# Patient Record
Sex: FEMALE | Race: WHITE | NOT HISPANIC OR LATINO | Employment: OTHER | URBAN - METROPOLITAN AREA
[De-identification: names, ages, dates, MRNs, and addresses within clinical notes are randomized per-mention and may not be internally consistent; named-entity substitution may affect disease eponyms.]

---

## 2018-01-29 PROBLEM — F41.8 ANXIETY WITH DEPRESSION: Status: ACTIVE | Noted: 2018-01-29

## 2019-02-08 ENCOUNTER — TELEPHONE (OUTPATIENT)
Dept: PHARMACY | Facility: CLINIC | Age: 49
End: 2019-02-08

## 2019-02-13 NOTE — TELEPHONE ENCOUNTER
DOCUMENTATION ONLY:  Prior Authorization for Emgality approved from 02/12/19 to 02/12/2020    Case Id: [id number]    Co-pay: $3.00    Patient Assistance IS NOT required.    Forwarded to the clinical pharmacist for consult and shipment.    -ARR

## 2019-02-28 ENCOUNTER — TELEPHONE (OUTPATIENT)
Dept: PHARMACY | Facility: CLINIC | Age: 49
End: 2019-02-28

## 2019-02-28 NOTE — TELEPHONE ENCOUNTER
Initial Emgality consult completed on . Emgality 120mg maintenance dose will be shipped on 3/6 to arrive at patient's home on 3/7 via [employer]. $3.00 copay. Patient intends to continue Emgality on 3/7. Meds, allergies, and conditions reviewed. Address confirmed. Confirmed 2 patient identifiers - name and . Therapy Appropriate.    Ms. Dominguez was started on Emgality in the provider's office on 19 so she has already had her loading dose. She has been doing well with no side effects.     --Injection experience: had loading dose in office.  Informed patient on online injection video on  website.    Counseled patient on administration directions:  - Inject two injections (240mg) into the skin as a loading dose, followed by one injection (120mg) once monthly.   - Store in refrigerator prior to use (do not freeze, do not shake, keep in original box until use).   - Take out of the refrigerator 30 minutes prior to injection to reach room temperature.  - Wash hands before and after injection.  - Monthly RX will come with gauze, band aids, and alcohol swabs.  - Patient may self-inject in either the front/top of the thighs, abdomen- but at least 2 inches away from belly button   - If someone else is giving the injection, they may also use the outer part of your upper arm or your buttocks.   - If injecting 2 pens for the loading dose, use 2 different injection sites.   - Patient was instructed to rotate injections sites monthly.  - Inspect medication - should be clear and colorless to slightly yellow to slightly brown.   - Patient is to wipe down the injection site with the alcohol pad, wait to dry.    - Remove white base cap from pen (twist to remove).  - Place the pen flat against the injection site and turn the lock ring to the unlocked position then push down and hold the teal button - there will be an initial click; in 10 seconds you will hear a second click.  - Remove the pen from skin and check to see  if the gray plunger is visible - this will indicate that the injection is complete.   - Patient will use sharps container; once full, per state law, she/he may securely lock the sharps container and place in trash. Pharmacy will replace the sharps at no additional charge.    Patient was counseled on possible side effects:  - Injection site reaction: redness, soreness, itching, bruising, which should resolve within 3-5 days.  - Allergic reactions: itching, rash, hives, swelling of face, mouth, tongue, throat, trouble breathing.   - Informed that there is no data in pregnancy/breastfeeding.     Advised patient to keep a calendar to stay compliant.   Consultation included: indication; goals of treatment; administration; storage and handling; side effects; how to handle side effects; the importance of compliance; the importance of keeping all follow up appointments.  Patient understands to report any medication changes to [facility] and provider. All questions answered and addressed to patients satisfaction. [facility] to contact patient in 3 weeks for refills.    Primo Segovia, PharmD  Clinical Pharmacist  [facility]  P: [phone]

## 2019-03-28 ENCOUNTER — TELEPHONE (OUTPATIENT)
Dept: PHARMACY | Facility: CLINIC | Age: 49
End: 2019-03-28

## 2019-04-29 ENCOUNTER — TELEPHONE (OUTPATIENT)
Dept: PHARMACY | Facility: CLINIC | Age: 49
End: 2019-04-29

## 2019-05-28 ENCOUNTER — TELEPHONE (OUTPATIENT)
Dept: PHARMACY | Facility: CLINIC | Age: 49
End: 2019-05-28

## 2019-06-06 NOTE — TELEPHONE ENCOUNTER
Call attempt 4 for Emgality refill - LVM, no MyChart available. Sending postcard to address on file and InBasket to provider. Copay $0 at 004.

## 2019-07-05 ENCOUNTER — TELEPHONE (OUTPATIENT)
Dept: PHARMACY | Facility: CLINIC | Age: 49
End: 2019-07-05

## 2019-09-06 ENCOUNTER — TELEPHONE (OUTPATIENT)
Dept: PHARMACY | Facility: CLINIC | Age: 49
End: 2019-09-06

## 2019-09-06 NOTE — TELEPHONE ENCOUNTER
Refill call regarding Emgality at [facility]. Will prepare for shipment on 9/10 to arrive  with pt consent. Copay 0.00.  Patient has not started any new medications, no missed doses/ side effects. Patient did not have any concerns or questions for the pharmacist at this time.  & address verified.  ~ next injection

## 2019-10-09 ENCOUNTER — TELEPHONE (OUTPATIENT)
Dept: PHARMACY | Facility: CLINIC | Age: 49
End: 2019-10-09

## 2019-10-09 NOTE — TELEPHONE ENCOUNTER
Refill and followup call for Emgality. Patient confirmed need of the refill. Will deliver via [employer] on 10/10 to arrive on 10/11 with patient consent. Copay $0.00 at 004. Address confirmed. Patient has 0 doses remaining (0 day supply)/ next injection due . Patient denies missed doses and no side effects.  No new medications/allergies/medical conditions. Labs are stable. No ER/Urgent care visits in past month. Sharps container needed. Patient taking the medication as directed. Patient denies any further questions. Confirmed 2 patient identifiers - Name and .     Primo Segovia, PharmD  Clinical Pharmacist  [facility]  P: [phone]

## 2019-11-05 ENCOUNTER — TELEPHONE (OUTPATIENT)
Dept: PHARMACY | Facility: CLINIC | Age: 49
End: 2019-11-05

## 2019-12-04 ENCOUNTER — TELEPHONE (OUTPATIENT)
Dept: PHARMACY | Facility: CLINIC | Age: 49
End: 2019-12-04

## 2019-12-19 NOTE — TELEPHONE ENCOUNTER
4th call attempt for Emgality refill. Called all numbers in epic. LVM on mobile. MyChart declined. Unable to sent InBasket to provider. Sending postcard to patient's address on file.

## 2020-12-28 ENCOUNTER — HOSPITAL ENCOUNTER (EMERGENCY)
Facility: HOSPITAL | Age: 50
Discharge: HOME | End: 2020-12-28
Attending: EMERGENCY MEDICINE
Payer: OTHER MISCELLANEOUS

## 2020-12-28 VITALS
SYSTOLIC BLOOD PRESSURE: 136 MMHG | WEIGHT: 135 LBS | OXYGEN SATURATION: 97 % | HEIGHT: 62 IN | DIASTOLIC BLOOD PRESSURE: 90 MMHG | HEART RATE: 102 BPM | BODY MASS INDEX: 24.84 KG/M2 | TEMPERATURE: 99 F | RESPIRATION RATE: 18 BRPM
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
## 2020-12-28 DIAGNOSIS — M25.561 ACUTE PAIN OF RIGHT KNEE: Primary | ICD-10-CM

## 2020-12-28 PROCEDURE — 25000003 PHARM REV CODE 250: Performed by: EMERGENCY MEDICINE

## 2020-12-28 PROCEDURE — 99284 EMERGENCY DEPT VISIT MOD MDM: CPT | Mod: 25

## 2020-12-28 RX ORDER — HYDROCODONE BITARTRATE AND ACETAMINOPHEN 5; 325 MG/1; MG/1
1 TABLET ORAL
Status: COMPLETED | OUTPATIENT
Start: 2020-12-28 | End: 2020-12-28

## 2020-12-28 RX ORDER — DICLOFENAC SODIUM 10 MG/G
2 GEL TOPICAL 4 TIMES DAILY
Qty: 100 G | Refills: 0 | Status: SHIPPED | OUTPATIENT
Start: 2020-12-28 | End: 2021-06-25 | Stop reason: SDUPTHER

## 2020-12-28 RX ORDER — KETOROLAC TROMETHAMINE 10 MG/1
10 TABLET, FILM COATED ORAL EVERY 6 HOURS
Qty: 20 TABLET | Refills: 0 | Status: SHIPPED | OUTPATIENT
Start: 2020-12-28 | End: 2021-01-02

## 2020-12-28 RX ADMIN — HYDROCODONE BITARTRATE AND ACETAMINOPHEN 1 TABLET: 5; 325 TABLET ORAL at 07:12

## 2020-12-28 NOTE — Clinical Note
"Angelica Hicks" Harjinder was seen and treated in our emergency department on 12/28/2020.  She may return to work on 12/31/2020.       If you have any questions or concerns, please don't hesitate to call.       RN    "

## 2020-12-28 NOTE — ED PROVIDER NOTES
Encounter Date: 12/28/2020       History     Chief Complaint   Patient presents with    Knee Injury     fell on right knee last night at work.  c/o pain to knee     HPI     50-year-old female past medical history of anxiety, CAD, hypertension hyperlipidemia presenting with right knee pain.  Patient reports she was at work last night walking around, fell directly onto her right knee.  She reports no numbness or tingling in her foot, reports that she continued to walk on it, and that it is now extremely painful when she moves it.  She reports no prior injury to the knee, states she has no further injuries.    Review of patient's allergies indicates:   Allergen Reactions    Ativan [lorazepam] Other (See Comments)     seizures    Hydroxyzine Hallucinations     Past Medical History:   Diagnosis Date    Anxiety     Atherosclerotic PVD with intermittent claudication 9/6/2013    CAD (coronary artery disease) 9/6/2013    Cancer     Coronary artery disease     HTN (hypertension) 9/6/2013    Hyperlipidemia 9/6/2013    Hypertension     MI (myocardial infarction)     Unstable angina pectoris 9/6/2013     Past Surgical History:   Procedure Laterality Date    CORONARY ANGIOPLASTY      tubaligation       Family History   Problem Relation Age of Onset    Heart attack Mother     Stroke Father     Heart disease Brother      Social History     Tobacco Use    Smoking status: Current Every Day Smoker     Packs/day: 0.05    Smokeless tobacco: Never Used    Tobacco comment: past hx of 1.5 PPD   Substance Use Topics    Alcohol use: Yes     Comment: rarely    Drug use: Not on file     Review of Systems   Constitutional: Negative.    HENT: Negative.    Eyes: Negative.    Respiratory: Negative.    Cardiovascular: Negative.    Gastrointestinal: Negative.    Genitourinary: Negative.    Musculoskeletal:        Right knee pain   Skin: Negative.    Neurological: Negative.        Physical Exam     Initial Vitals [12/28/20  0712]   BP Pulse Resp Temp SpO2   (!) 136/90 102 18 98.8 °F (37.1 °C) 97 %      MAP       --         Physical Exam    Nursing note and vitals reviewed.  Constitutional: She appears well-developed and well-nourished. She is not diaphoretic. No distress.   HENT:   Head: Normocephalic and atraumatic.   Nose: Nose normal.   Eyes: EOM are normal. Pupils are equal, round, and reactive to light.   Neck: Normal range of motion. Neck supple.   Cardiovascular: Normal rate and regular rhythm.   Pulmonary/Chest: No stridor. No respiratory distress.   Abdominal: Soft. She exhibits no distension. There is no abdominal tenderness.   Musculoskeletal: Normal range of motion. Tenderness (right knee ttp, small amount of associated edema, able to bear weight, distal dp pulse intact) present. No edema.   Neurological: She is alert and oriented to person, place, and time.   Skin: Skin is warm and dry. Capillary refill takes less than 2 seconds. No rash noted. No erythema.         ED Course   Procedures  Labs Reviewed - No data to display       Imaging Results          X-Ray Knee 3 View Right (In process)                                 50-year-old female past medical history of anxiety, CAD, hypertension hyperlipidemia presenting with right knee pain.  Physical exam showing mild tenderness over right medial inferior portion of the knee, pain with range of motion, able to bear weight, distal DP pulse intact, no further swelling or traumatic injury noted.  X-ray right knee unremarkable, patient advised on further rice therapies.  Patient ambulatory, tolerating weight easily, vital stable.  Advised patient on further plan of management in addition to follow-up with PCP in 1 week for reassessment.  All precautions discussed, all questions answered, patient discharged home improved stable.                    Clinical Impression:       ICD-10-CM ICD-9-CM   1. Acute pain of right knee  M25.561 719.46   2. Fall  W19.XXXA E888.9                           ED Disposition Condition    Discharge Stable        ED Prescriptions     Medication Sig Dispense Start Date End Date Auth. Provider    ketorolac (TORADOL) 10 mg tablet Take 1 tablet (10 mg total) by mouth every 6 (six) hours. for 5 days 20 tablet 12/28/2020 1/2/2021 Jordan Vickers MD    diclofenac sodium (VOLTAREN) 1 % Gel Apply 2 g topically 4 (four) times daily. 100 g 12/28/2020  Jordan Vickers MD        Follow-up Information     Follow up With Specialties Details Why Contact Info Additional Information    [facility] Emergency Medicine Go to  If symptoms worsen [address] [place] [address] [id number] [phone] Floor [address]                                       Jordan Vickers MD  12/28/20 0895

## 2021-03-25 ENCOUNTER — HOSPITAL ENCOUNTER (EMERGENCY)
Facility: HOSPITAL | Age: 51
Discharge: HOME | End: 2021-03-25
Attending: EMERGENCY MEDICINE
Payer: MEDICAID

## 2021-03-25 VITALS
WEIGHT: 126 LBS | HEART RATE: 84 BPM | RESPIRATION RATE: 16 BRPM | OXYGEN SATURATION: 97 % | DIASTOLIC BLOOD PRESSURE: 75 MMHG | BODY MASS INDEX: 23.19 KG/M2 | SYSTOLIC BLOOD PRESSURE: 132 MMHG | HEIGHT: 62 IN | TEMPERATURE: 99 F

## 2021-03-25 DIAGNOSIS — R11.2 NAUSEA VOMITING AND DIARRHEA: Primary | ICD-10-CM

## 2021-03-25 DIAGNOSIS — R19.7 NAUSEA VOMITING AND DIARRHEA: Primary | ICD-10-CM

## 2021-03-25 LAB
ALBUMIN SERPL BCP-MCNC: 3.7 G/DL (ref 3.5–5.2)
ALP SERPL-CCNC: 98 U/L (ref 55–135)
ALT SERPL W/O P-5'-P-CCNC: 28 U/L (ref 10–44)
ANION GAP SERPL CALC-SCNC: 8 MMOL/L (ref 8–16)
AST SERPL-CCNC: 21 U/L (ref 10–40)
BASOPHILS # BLD AUTO: 0.03 K/UL (ref 0–0.2)
BASOPHILS NFR BLD: 0.6 % (ref 0–1.9)
BILIRUB SERPL-MCNC: 0.3 MG/DL (ref 0.1–1)
BUN SERPL-MCNC: 16 MG/DL (ref 6–20)
CALCIUM SERPL-MCNC: 8.6 MG/DL (ref 8.7–10.5)
CHLORIDE SERPL-SCNC: 98 MMOL/L (ref 95–110)
CO2 SERPL-SCNC: 26 MMOL/L (ref 23–29)
CREAT SERPL-MCNC: 0.9 MG/DL (ref 0.5–1.4)
DIFFERENTIAL METHOD: NORMAL
EOSINOPHIL # BLD AUTO: 0.1 K/UL (ref 0–0.5)
EOSINOPHIL NFR BLD: 1.4 % (ref 0–8)
ERYTHROCYTE [DISTWIDTH] IN BLOOD BY AUTOMATED COUNT: 13.2 % (ref 11.5–14.5)
EST. GFR  (AFRICAN AMERICAN): >60 ML/MIN/1.73 M^2
EST. GFR  (NON AFRICAN AMERICAN): >60 ML/MIN/1.73 M^2
GLUCOSE SERPL-MCNC: 98 MG/DL (ref 70–110)
HCT VFR BLD AUTO: 43.8 % (ref 37–48.5)
HGB BLD-MCNC: 14.7 G/DL (ref 12–16)
IMM GRANULOCYTES # BLD AUTO: 0.01 K/UL (ref 0–0.04)
IMM GRANULOCYTES NFR BLD AUTO: 0.2 % (ref 0–0.5)
LYMPHOCYTES # BLD AUTO: 1.5 K/UL (ref 1–4.8)
LYMPHOCYTES NFR BLD: 30.2 % (ref 18–48)
MCH RBC QN AUTO: 30.8 PG (ref 27–31)
MCHC RBC AUTO-ENTMCNC: 33.6 G/DL (ref 32–36)
MCV RBC AUTO: 92 FL (ref 82–98)
MONOCYTES # BLD AUTO: 0.7 K/UL (ref 0.3–1)
MONOCYTES NFR BLD: 13.4 % (ref 4–15)
NEUTROPHILS # BLD AUTO: 2.7 K/UL (ref 1.8–7.7)
NEUTROPHILS NFR BLD: 54.2 % (ref 38–73)
NRBC BLD-RTO: 0 /100 WBC
PLATELET # BLD AUTO: 254 K/UL (ref 150–350)
PMV BLD AUTO: 10 FL (ref 9.2–12.9)
POTASSIUM SERPL-SCNC: 3.9 MMOL/L (ref 3.5–5.1)
PROT SERPL-MCNC: 7.6 G/DL (ref 6–8.4)
RBC # BLD AUTO: 4.77 M/UL (ref 4–5.4)
SODIUM SERPL-SCNC: 132 MMOL/L (ref 136–145)
WBC # BLD AUTO: 4.94 K/UL (ref 3.9–12.7)

## 2021-03-25 PROCEDURE — 80053 COMPREHEN METABOLIC PANEL: CPT | Performed by: NURSE PRACTITIONER

## 2021-03-25 PROCEDURE — 85025 COMPLETE CBC W/AUTO DIFF WBC: CPT | Performed by: NURSE PRACTITIONER

## 2021-03-25 PROCEDURE — 96374 THER/PROPH/DIAG INJ IV PUSH: CPT

## 2021-03-25 PROCEDURE — 99284 EMERGENCY DEPT VISIT MOD MDM: CPT | Mod: 25

## 2021-03-25 PROCEDURE — 96372 THER/PROPH/DIAG INJ SC/IM: CPT | Mod: 59

## 2021-03-25 PROCEDURE — 25000003 PHARM REV CODE 250: Performed by: NURSE PRACTITIONER

## 2021-03-25 PROCEDURE — 96361 HYDRATE IV INFUSION ADD-ON: CPT

## 2021-03-25 PROCEDURE — 36415 COLL VENOUS BLD VENIPUNCTURE: CPT | Performed by: NURSE PRACTITIONER

## 2021-03-25 PROCEDURE — 63600175 PHARM REV CODE 636 W HCPCS: Performed by: NURSE PRACTITIONER

## 2021-03-25 RX ORDER — METOPROLOL TARTRATE 25 MG/1
25 TABLET, FILM COATED ORAL 2 TIMES DAILY
COMMUNITY
End: 2021-06-25

## 2021-03-25 RX ORDER — LISINOPRIL 5 MG/1
5 TABLET ORAL DAILY
COMMUNITY
End: 2021-06-25

## 2021-03-25 RX ORDER — DICYCLOMINE HYDROCHLORIDE 10 MG/ML
20 INJECTION INTRAMUSCULAR
Status: COMPLETED | OUTPATIENT
Start: 2021-03-25 | End: 2021-03-25

## 2021-03-25 RX ORDER — DICYCLOMINE HYDROCHLORIDE 20 MG/1
20 TABLET ORAL 2 TIMES DAILY
Qty: 6 TABLET | Refills: 0 | Status: SHIPPED | OUTPATIENT
Start: 2021-03-25 | End: 2021-03-28

## 2021-03-25 RX ORDER — ONDANSETRON 2 MG/ML
4 INJECTION INTRAMUSCULAR; INTRAVENOUS
Status: COMPLETED | OUTPATIENT
Start: 2021-03-25 | End: 2021-03-25

## 2021-03-25 RX ORDER — ONDANSETRON 4 MG/1
4 TABLET, FILM COATED ORAL EVERY 8 HOURS PRN
Qty: 9 TABLET | Refills: 0 | Status: SHIPPED | OUTPATIENT
Start: 2021-03-25 | End: 2021-03-28

## 2021-03-25 RX ADMIN — ONDANSETRON 4 MG: 2 INJECTION INTRAMUSCULAR; INTRAVENOUS at 07:03

## 2021-03-25 RX ADMIN — SODIUM CHLORIDE 1000 ML: 0.9 INJECTION, SOLUTION INTRAVENOUS at 07:03

## 2021-03-25 RX ADMIN — DICYCLOMINE HYDROCHLORIDE 20 MG: 20 INJECTION, SOLUTION INTRAMUSCULAR at 07:03

## 2021-03-26 ENCOUNTER — PATIENT OUTREACH (OUTPATIENT)
Dept: EMERGENCY MEDICINE | Facility: HOSPITAL | Age: 51
End: 2021-03-26

## 2021-04-11 ENCOUNTER — HOSPITAL ENCOUNTER (EMERGENCY)
Facility: HOSPITAL | Age: 51
Discharge: HOME | End: 2021-04-11
Attending: FAMILY MEDICINE
Payer: MEDICAID

## 2021-04-11 VITALS
HEIGHT: 61 IN | WEIGHT: 126 LBS | RESPIRATION RATE: 18 BRPM | TEMPERATURE: 98 F | DIASTOLIC BLOOD PRESSURE: 95 MMHG | BODY MASS INDEX: 23.79 KG/M2 | SYSTOLIC BLOOD PRESSURE: 145 MMHG | OXYGEN SATURATION: 99 % | HEART RATE: 84 BPM

## 2021-04-11 DIAGNOSIS — K04.7 DENTAL ABSCESS: Primary | ICD-10-CM

## 2021-04-11 PROCEDURE — 25000003 PHARM REV CODE 250: Performed by: NURSE PRACTITIONER

## 2021-04-11 PROCEDURE — 99284 EMERGENCY DEPT VISIT MOD MDM: CPT

## 2021-04-11 RX ORDER — PENICILLIN V POTASSIUM 500 MG/1
500 TABLET, FILM COATED ORAL EVERY 6 HOURS
Qty: 28 TABLET | Refills: 0 | Status: SHIPPED | OUTPATIENT
Start: 2021-04-11 | End: 2021-06-25

## 2021-04-11 RX ORDER — PENICILLIN V POTASSIUM 250 MG/1
500 TABLET, FILM COATED ORAL
Status: COMPLETED | OUTPATIENT
Start: 2021-04-11 | End: 2021-04-11

## 2021-04-11 RX ORDER — TRAMADOL HYDROCHLORIDE 50 MG/1
100 TABLET ORAL
Status: COMPLETED | OUTPATIENT
Start: 2021-04-11 | End: 2021-04-11

## 2021-04-11 RX ORDER — IBUPROFEN 800 MG/1
800 TABLET ORAL EVERY 6 HOURS PRN
Qty: 20 TABLET | Refills: 0 | OUTPATIENT
Start: 2021-04-11 | End: 2021-05-25

## 2021-04-11 RX ADMIN — TRAMADOL HYDROCHLORIDE 100 MG: 50 TABLET, COATED ORAL at 03:04

## 2021-04-11 RX ADMIN — PENICILLIN V POTASSIUM 500 MG: 250 TABLET, FILM COATED ORAL at 03:04

## 2021-05-25 ENCOUNTER — HOSPITAL ENCOUNTER (EMERGENCY)
Facility: HOSPITAL | Age: 51
Discharge: HOME | End: 2021-05-25
Attending: EMERGENCY MEDICINE
Payer: MEDICAID

## 2021-05-25 VITALS
WEIGHT: 134.38 LBS | HEIGHT: 61 IN | TEMPERATURE: 98 F | DIASTOLIC BLOOD PRESSURE: 56 MMHG | SYSTOLIC BLOOD PRESSURE: 107 MMHG | RESPIRATION RATE: 16 BRPM | OXYGEN SATURATION: 98 % | HEART RATE: 82 BPM | BODY MASS INDEX: 25.37 KG/M2

## 2021-05-25 DIAGNOSIS — M19.90 ARTHRITIS: Primary | ICD-10-CM

## 2021-05-25 DIAGNOSIS — M79.641 RIGHT HAND PAIN: ICD-10-CM

## 2021-05-25 DIAGNOSIS — M25.532 LEFT WRIST PAIN: ICD-10-CM

## 2021-05-25 PROCEDURE — 63600175 PHARM REV CODE 636 W HCPCS: Performed by: NURSE PRACTITIONER

## 2021-05-25 PROCEDURE — 96372 THER/PROPH/DIAG INJ SC/IM: CPT

## 2021-05-25 PROCEDURE — 99284 EMERGENCY DEPT VISIT MOD MDM: CPT | Mod: 25

## 2021-05-25 RX ORDER — MELOXICAM 15 MG/1
15 TABLET ORAL DAILY PRN
Qty: 7 TABLET | Refills: 0 | Status: SHIPPED | OUTPATIENT
Start: 2021-05-25 | End: 2021-06-01

## 2021-05-25 RX ORDER — MELOXICAM 15 MG/1
15 TABLET ORAL DAILY
Qty: 7 TABLET | Refills: 0 | Status: SHIPPED | OUTPATIENT
Start: 2021-05-25 | End: 2021-05-25 | Stop reason: SDUPTHER

## 2021-05-25 RX ORDER — METHYLPREDNISOLONE 4 MG/1
TABLET ORAL
Qty: 1 PACKAGE | Refills: 0 | Status: SHIPPED | OUTPATIENT
Start: 2021-05-25 | End: 2021-06-25

## 2021-05-25 RX ORDER — KETOROLAC TROMETHAMINE 30 MG/ML
30 INJECTION, SOLUTION INTRAMUSCULAR; INTRAVENOUS
Status: COMPLETED | OUTPATIENT
Start: 2021-05-25 | End: 2021-05-25

## 2021-05-25 RX ADMIN — KETOROLAC TROMETHAMINE 30 MG: 30 INJECTION, SOLUTION INTRAMUSCULAR; INTRAVENOUS at 07:05

## 2021-07-12 ENCOUNTER — PATIENT OUTREACH (OUTPATIENT)
Dept: ADMINISTRATIVE | Facility: HOSPITAL | Age: 51
End: 2021-07-12

## 2022-03-04 DIAGNOSIS — Z12.11 COLON CANCER SCREENING: ICD-10-CM

## 2022-04-04 ENCOUNTER — HOSPITAL ENCOUNTER (EMERGENCY)
Facility: HOSPITAL | Age: 52
Discharge: HOME | End: 2022-04-04
Attending: STUDENT IN AN ORGANIZED HEALTH CARE EDUCATION/TRAINING PROGRAM
Payer: MEDICAID

## 2022-04-04 VITALS
DIASTOLIC BLOOD PRESSURE: 80 MMHG | TEMPERATURE: 98 F | SYSTOLIC BLOOD PRESSURE: 106 MMHG | HEART RATE: 98 BPM | OXYGEN SATURATION: 97 % | WEIGHT: 128 LBS | HEIGHT: 61 IN | RESPIRATION RATE: 18 BRPM | BODY MASS INDEX: 24.17 KG/M2

## 2022-04-04 DIAGNOSIS — K04.7 DENTAL ABSCESS: Primary | ICD-10-CM

## 2022-04-04 PROCEDURE — 99284 EMERGENCY DEPT VISIT MOD MDM: CPT

## 2022-04-04 RX ORDER — PENICILLIN V POTASSIUM 500 MG/1
500 TABLET, FILM COATED ORAL 4 TIMES DAILY
Qty: 40 TABLET | Refills: 0 | Status: SHIPPED | OUTPATIENT
Start: 2022-04-04 | End: 2022-04-11

## 2022-04-04 RX ORDER — LIDOCAINE HYDROCHLORIDE 20 MG/ML
SOLUTION OROPHARYNGEAL
Qty: 120 ML | Refills: 0 | Status: SHIPPED | OUTPATIENT
Start: 2022-04-04

## 2022-04-04 RX ORDER — IBUPROFEN 800 MG/1
800 TABLET ORAL EVERY 6 HOURS PRN
Qty: 20 TABLET | Refills: 0 | OUTPATIENT
Start: 2022-04-04 | End: 2022-05-03

## 2022-04-04 NOTE — ED PROVIDER NOTES
Encounter Date: 4/4/2022       History     Chief Complaint   Patient presents with    Dental Pain     Pt stated that for the past 4 days that she has been experiencing pain to right side of her mouth - suspected to be from dental problems.      Angelica Grande is an 51 y.o. female who complains of dental pain right top and bottom.  Has been ongoing problem but has not seen a dentist.  Instructed patient on walking clinic test [facility]        Review of patient's allergies indicates:   Allergen Reactions    Ativan [lorazepam] Other (See Comments)     seizures    Hydroxyzine Hallucinations     Past Medical History:   Diagnosis Date    Anxiety     Atherosclerotic PVD with intermittent claudication 09/06/2013    CAD (coronary artery disease) 09/06/2013    Cancer     Coronary artery disease     HTN (hypertension) 09/06/2013    Hyperlipidemia 09/06/2013    Hypertension     MI (myocardial infarction)     Seizures     Unstable angina pectoris 09/06/2013     Past Surgical History:   Procedure Laterality Date    CORONARY ANGIOPLASTY      stents in heart      tubaligation       Family History   Problem Relation Age of Onset    Heart attack Mother     Stroke Father     Heart disease Brother      Social History     Tobacco Use    Smoking status: Current Every Day Smoker     Packs/day: 0.05    Smokeless tobacco: Never Used    Tobacco comment: past hx of 1.5 PPD   Substance Use Topics    Alcohol use: Yes     Comment: rarely    Drug use: Not Currently     Review of Systems   Constitutional: Negative for fever.   HENT: Positive for dental problem. Negative for sore throat.    Respiratory: Negative for shortness of breath.    Cardiovascular: Negative for chest pain.   Gastrointestinal: Negative for nausea.   Genitourinary: Negative for dysuria.   Musculoskeletal: Negative for back pain.   Skin: Negative for rash.   Neurological: Negative for weakness.   Hematological: Does not bruise/bleed  easily.   All other systems reviewed and are negative.      Physical Exam     Initial Vitals [04/04/22 1115]   BP Pulse Resp Temp SpO2   106/80 98 18 97.6 °F (36.4 °C) 97 %      MAP       --         Physical Exam    Nursing note and vitals reviewed.  Constitutional: She appears well-developed and well-nourished.   HENT:   Head: Normocephalic and atraumatic.   Mouth/Throat: Abnormal dentition. Dental abscesses and dental caries present.   Eyes: Pupils are equal, round, and reactive to light.   Neck:   Normal range of motion.  Cardiovascular: Normal rate and regular rhythm.   Pulmonary/Chest: Breath sounds normal.   Abdominal: Abdomen is soft. Bowel sounds are normal.   Musculoskeletal:         General: Normal range of motion.      Cervical back: Normal range of motion.     Neurological: She is alert and oriented to person, place, and time.   Skin: Skin is warm and dry.   Psychiatric: She has a normal mood and affect.         ED Course   Procedures  Labs Reviewed - No data to display       Imaging Results    None          Medications - No data to display  Medical Decision Making:   Differential Diagnosis:   Dental abscess, dental caries                      Clinical Impression:   Final diagnoses:  [K04.7] Dental abscess (Primary)          ED Disposition Condition    Discharge Stable        ED Prescriptions     Medication Sig Dispense Start Date End Date Auth. Provider    penicillin v potassium (VEETID) 500 MG tablet Take 1 tablet (500 mg total) by mouth 4 (four) times daily. for 7 days 40 tablet 4/4/2022 4/11/2022 Renuka Camargo NP    LIDOcaine HCl 2% (LIDOCAINE VISCOUS) 2 % Soln by Mucous Membrane route every 3 (three) hours. Swish and spit 120 mL 4/4/2022  Renuka Camargo NP    ibuprofen (ADVIL,MOTRIN) 800 MG tablet Take 1 tablet (800 mg total) by mouth every 6 (six) hours as needed for Pain. 20 tablet 4/4/2022  Renuka Camargo NP        Follow-up Information     Follow up With Specialties Details  Why Contact Info    Nile Greenfield II, NP Emergency Medicine  As needed [address] [facility] [address]  [phone]             Renuka Camargo NP  04/04/22 1110

## 2022-05-03 ENCOUNTER — HOSPITAL ENCOUNTER (EMERGENCY)
Facility: HOSPITAL | Age: 52
Discharge: HOME | End: 2022-05-03
Attending: STUDENT IN AN ORGANIZED HEALTH CARE EDUCATION/TRAINING PROGRAM
Payer: MEDICAID

## 2022-05-03 VITALS
HEIGHT: 61 IN | HEART RATE: 86 BPM | BODY MASS INDEX: 24.55 KG/M2 | DIASTOLIC BLOOD PRESSURE: 83 MMHG | OXYGEN SATURATION: 95 % | SYSTOLIC BLOOD PRESSURE: 126 MMHG | TEMPERATURE: 98 F | WEIGHT: 130 LBS | RESPIRATION RATE: 20 BRPM

## 2022-05-03 DIAGNOSIS — R07.89 CHEST TIGHTNESS: ICD-10-CM

## 2022-05-03 DIAGNOSIS — R06.02 SOB (SHORTNESS OF BREATH): ICD-10-CM

## 2022-05-03 DIAGNOSIS — R05.9 COUGH: Primary | ICD-10-CM

## 2022-05-03 LAB
ALBUMIN SERPL BCP-MCNC: 3.3 G/DL (ref 3.5–5.2)
ALP SERPL-CCNC: 108 U/L (ref 55–135)
ALT SERPL W/O P-5'-P-CCNC: 20 U/L (ref 10–44)
ANION GAP SERPL CALC-SCNC: 5 MMOL/L (ref 8–16)
AST SERPL-CCNC: 9 U/L (ref 10–40)
BASOPHILS # BLD AUTO: 0.08 K/UL (ref 0–0.2)
BASOPHILS NFR BLD: 0.7 % (ref 0–1.9)
BILIRUB SERPL-MCNC: 0.2 MG/DL (ref 0.1–1)
BUN SERPL-MCNC: 11 MG/DL (ref 6–20)
CALCIUM SERPL-MCNC: 8.9 MG/DL (ref 8.7–10.5)
CHLORIDE SERPL-SCNC: 107 MMOL/L (ref 95–110)
CO2 SERPL-SCNC: 29 MMOL/L (ref 23–29)
CREAT SERPL-MCNC: 0.8 MG/DL (ref 0.5–1.4)
CTP QC/QA: YES
CTP QC/QA: YES
DIFFERENTIAL METHOD: NORMAL
EOSINOPHIL # BLD AUTO: 0.2 K/UL (ref 0–0.5)
EOSINOPHIL NFR BLD: 1.4 % (ref 0–8)
ERYTHROCYTE [DISTWIDTH] IN BLOOD BY AUTOMATED COUNT: 14.2 % (ref 11.5–14.5)
EST. GFR  (AFRICAN AMERICAN): >60 ML/MIN/1.73 M^2
EST. GFR  (NON AFRICAN AMERICAN): >60 ML/MIN/1.73 M^2
GLUCOSE SERPL-MCNC: 102 MG/DL (ref 70–110)
HCT VFR BLD AUTO: 42.5 % (ref 37–48.5)
HGB BLD-MCNC: 14.2 G/DL (ref 12–16)
IMM GRANULOCYTES # BLD AUTO: 0.04 K/UL (ref 0–0.04)
IMM GRANULOCYTES NFR BLD AUTO: 0.4 % (ref 0–0.5)
LYMPHOCYTES # BLD AUTO: 3.5 K/UL (ref 1–4.8)
LYMPHOCYTES NFR BLD: 31.6 % (ref 18–48)
MCH RBC QN AUTO: 30.6 PG (ref 27–31)
MCHC RBC AUTO-ENTMCNC: 33.4 G/DL (ref 32–36)
MCV RBC AUTO: 92 FL (ref 82–98)
MONOCYTES # BLD AUTO: 0.9 K/UL (ref 0.3–1)
MONOCYTES NFR BLD: 7.8 % (ref 4–15)
NEUTROPHILS # BLD AUTO: 6.5 K/UL (ref 1.8–7.7)
NEUTROPHILS NFR BLD: 58.1 % (ref 38–73)
NRBC BLD-RTO: 0 /100 WBC
PLATELET # BLD AUTO: 298 K/UL (ref 150–450)
PMV BLD AUTO: 10.1 FL (ref 9.2–12.9)
POC MOLECULAR INFLUENZA A AGN: NEGATIVE
POC MOLECULAR INFLUENZA B AGN: NEGATIVE
POTASSIUM SERPL-SCNC: 4.2 MMOL/L (ref 3.5–5.1)
PROT SERPL-MCNC: 7 G/DL (ref 6–8.4)
RBC # BLD AUTO: 4.64 M/UL (ref 4–5.4)
SARS-COV-2 RDRP RESP QL NAA+PROBE: NEGATIVE
SODIUM SERPL-SCNC: 141 MMOL/L (ref 136–145)
TROPONIN I SERPL DL<=0.01 NG/ML-MCNC: 4.1 PG/ML (ref 0–60)
WBC # BLD AUTO: 11.17 K/UL (ref 3.9–12.7)

## 2022-05-03 PROCEDURE — 99285 EMERGENCY DEPT VISIT HI MDM: CPT | Mod: 25

## 2022-05-03 PROCEDURE — 63600175 PHARM REV CODE 636 W HCPCS: Performed by: STUDENT IN AN ORGANIZED HEALTH CARE EDUCATION/TRAINING PROGRAM

## 2022-05-03 PROCEDURE — 84484 ASSAY OF TROPONIN QUANT: CPT | Performed by: CLINICAL NURSE SPECIALIST

## 2022-05-03 PROCEDURE — 96372 THER/PROPH/DIAG INJ SC/IM: CPT | Performed by: STUDENT IN AN ORGANIZED HEALTH CARE EDUCATION/TRAINING PROGRAM

## 2022-05-03 PROCEDURE — 87502 INFLUENZA DNA AMP PROBE: CPT

## 2022-05-03 PROCEDURE — 80053 COMPREHEN METABOLIC PANEL: CPT | Performed by: CLINICAL NURSE SPECIALIST

## 2022-05-03 PROCEDURE — 85025 COMPLETE CBC W/AUTO DIFF WBC: CPT | Performed by: CLINICAL NURSE SPECIALIST

## 2022-05-03 PROCEDURE — 25000242 PHARM REV CODE 250 ALT 637 W/ HCPCS: Performed by: CLINICAL NURSE SPECIALIST

## 2022-05-03 PROCEDURE — 93010 ELECTROCARDIOGRAM REPORT: CPT | Mod: ,,, | Performed by: INTERNAL MEDICINE

## 2022-05-03 PROCEDURE — 25000003 PHARM REV CODE 250: Performed by: STUDENT IN AN ORGANIZED HEALTH CARE EDUCATION/TRAINING PROGRAM

## 2022-05-03 PROCEDURE — 36415 COLL VENOUS BLD VENIPUNCTURE: CPT | Performed by: CLINICAL NURSE SPECIALIST

## 2022-05-03 PROCEDURE — 93010 EKG 12-LEAD: ICD-10-PCS | Mod: ,,, | Performed by: INTERNAL MEDICINE

## 2022-05-03 PROCEDURE — 93005 ELECTROCARDIOGRAM TRACING: CPT

## 2022-05-03 PROCEDURE — U0002 COVID-19 LAB TEST NON-CDC: HCPCS | Performed by: CLINICAL NURSE SPECIALIST

## 2022-05-03 PROCEDURE — 99900035 HC TECH TIME PER 15 MIN (STAT)

## 2022-05-03 PROCEDURE — 94640 AIRWAY INHALATION TREATMENT: CPT

## 2022-05-03 PROCEDURE — 99900031 HC PATIENT EDUCATION (STAT)

## 2022-05-03 RX ORDER — CYCLOBENZAPRINE HCL 10 MG
10 TABLET ORAL
Status: COMPLETED | OUTPATIENT
Start: 2022-05-03 | End: 2022-05-03

## 2022-05-03 RX ORDER — BENZONATATE 100 MG/1
100 CAPSULE ORAL 3 TIMES DAILY PRN
Qty: 20 CAPSULE | Refills: 0 | Status: SHIPPED | OUTPATIENT
Start: 2022-05-03 | End: 2022-05-13

## 2022-05-03 RX ORDER — IBUPROFEN 600 MG/1
600 TABLET ORAL EVERY 6 HOURS PRN
Qty: 20 TABLET | Refills: 0 | Status: SHIPPED | OUTPATIENT
Start: 2022-05-03

## 2022-05-03 RX ORDER — KETOROLAC TROMETHAMINE 30 MG/ML
30 INJECTION, SOLUTION INTRAMUSCULAR; INTRAVENOUS
Status: COMPLETED | OUTPATIENT
Start: 2022-05-03 | End: 2022-05-03

## 2022-05-03 RX ORDER — CYCLOBENZAPRINE HCL 10 MG
10 TABLET ORAL 3 TIMES DAILY PRN
Qty: 15 TABLET | Refills: 0 | Status: SHIPPED | OUTPATIENT
Start: 2022-05-03 | End: 2022-05-08

## 2022-05-03 RX ORDER — PREDNISONE 50 MG/1
50 TABLET ORAL DAILY
Qty: 6 TABLET | Refills: 0 | Status: SHIPPED | OUTPATIENT
Start: 2022-05-03 | End: 2022-05-09

## 2022-05-03 RX ORDER — DEXAMETHASONE SODIUM PHOSPHATE 4 MG/ML
8 INJECTION, SOLUTION INTRA-ARTICULAR; INTRALESIONAL; INTRAMUSCULAR; INTRAVENOUS; SOFT TISSUE
Status: DISCONTINUED | OUTPATIENT
Start: 2022-05-03 | End: 2022-05-03

## 2022-05-03 RX ORDER — IPRATROPIUM BROMIDE AND ALBUTEROL SULFATE 2.5; .5 MG/3ML; MG/3ML
3 SOLUTION RESPIRATORY (INHALATION)
Status: COMPLETED | OUTPATIENT
Start: 2022-05-03 | End: 2022-05-03

## 2022-05-03 RX ADMIN — KETOROLAC TROMETHAMINE 30 MG: 30 INJECTION, SOLUTION INTRAMUSCULAR; INTRAVENOUS at 07:05

## 2022-05-03 RX ADMIN — IPRATROPIUM BROMIDE AND ALBUTEROL SULFATE 3 ML: 2.5; .5 SOLUTION RESPIRATORY (INHALATION) at 07:05

## 2022-05-03 RX ADMIN — CYCLOBENZAPRINE HYDROCHLORIDE 10 MG: 10 TABLET, FILM COATED ORAL at 07:05

## 2022-05-03 NOTE — Clinical Note
"Angelica"Viv Grande was seen and treated in our emergency department on 5/3/2022.  She may return to work on 05/05/2022.       If you have any questions or concerns, please don't hesitate to call.      Ricki Rodriguez MD"

## 2022-05-04 NOTE — ED PROVIDER NOTES
Encounter Date: 5/3/2022       History     Chief Complaint   Patient presents with    Cough     c/o cough x's 4 days nonproductive w/ body aches smoker > 45 years -- c/o chest and lower back discomfort - last seizure 4/19/22     Angelica Grande is an 51 y.o. female who complains of nonproductive cough body aches for the last 4 days.  Patient is a 45 year plus smoker.  Patient also complains of body aches, back pain and chest pain.  History of MI, anxiety        Review of patient's allergies indicates:   Allergen Reactions    Ativan [lorazepam] Other (See Comments)     seizures    Hydroxyzine Hallucinations     Past Medical History:   Diagnosis Date    Anxiety     Atherosclerotic PVD with intermittent claudication 09/06/2013    CAD (coronary artery disease) 09/06/2013    Cancer     Coronary artery disease     HTN (hypertension) 09/06/2013    Hyperlipidemia 09/06/2013    Hypertension     MI (myocardial infarction)     Seizures     Unstable angina pectoris 09/06/2013     Past Surgical History:   Procedure Laterality Date    CORONARY ANGIOPLASTY      stents in heart      tubaligation       Family History   Problem Relation Age of Onset    Heart attack Mother     Stroke Father     Heart disease Brother      Social History     Tobacco Use    Smoking status: Current Every Day Smoker     Packs/day: 0.05    Smokeless tobacco: Never Used    Tobacco comment: past hx of 1.5 PPD   Substance Use Topics    Alcohol use: Yes     Comment: rarely    Drug use: Not Currently     Review of Systems   Constitutional: Negative for fever.   HENT: Negative for sore throat.    Respiratory: Positive for cough and chest tightness. Negative for shortness of breath.    Cardiovascular: Positive for chest pain.   Gastrointestinal: Negative for nausea.   Genitourinary: Negative for dysuria.   Musculoskeletal: Positive for arthralgias. Negative for back pain.   Skin: Negative for rash.   Neurological: Negative for  weakness.   Hematological: Does not bruise/bleed easily.   Psychiatric/Behavioral: Positive for agitation.   All other systems reviewed and are negative.      Physical Exam     Initial Vitals [05/03/22 1852]   BP Pulse Resp Temp SpO2   126/83 86 20 97.8 °F (36.6 °C) 95 %      MAP       --         Physical Exam    Nursing note and vitals reviewed.  Constitutional: She appears well-developed and well-nourished.   HENT:   Head: Normocephalic and atraumatic.   Eyes: Pupils are equal, round, and reactive to light.   Neck:   Normal range of motion.  Cardiovascular: Normal rate and regular rhythm.   Pulmonary/Chest: She has rhonchi.   Abdominal: Abdomen is soft. Bowel sounds are normal.   Musculoskeletal:         General: Normal range of motion.      Cervical back: Normal range of motion.     Neurological: She is alert and oriented to person, place, and time.   Skin: Skin is warm and dry.   Psychiatric: She has a normal mood and affect.         ED Course   Procedures  Labs Reviewed   COMPREHENSIVE METABOLIC PANEL - Abnormal; Notable for the following components:       Result Value    Albumin 3.3 (*)     AST 9 (*)     Anion Gap 5 (*)     All other components within normal limits   TROPONIN I HIGH SENSITIVITY   CBC W/ AUTO DIFFERENTIAL   SARS-COV-2 RDRP GENE    Narrative:     ..This test utilizes isothermal nucleic acid amplification   technology to detect the SARS-CoV-2 RdRp nucleic acid segment.   The analytical sensitivity (limit of detection) is 125 genome   equivalents/mL.   A POSITIVE result implies infection with the SARS-CoV-2 virus;   the patient is presumed to be contagious.     A NEGATIVE result means that SARS-CoV-2 nucleic acids are not   present above the limit of detection. A NEGATIVE result should be   treated as presumptive. It does not rule out the possibility of   COVID-19 and should not be the sole basis for treatment decisions.   If COVID-19 is strongly suspected based on clinical and exposure   history,  re-testing using an alternate molecular assay should be   considered.   This test is only for use under the Food and Drug   Administration s Emergency Use Authorization (EUA).   Commercial kits are provided by [employer].   Performance characteristics of the EUA have been independently   verified by [facility] Department of   Pathology and Laboratory Medicine.   _________________________________________________________________   The authorized Fact Sheet for Healthcare Providers and the authorized Fact   Sheet for Patients of the ID NOW COVID-19 are available on the FDA   website:     https://www.fda.gov/media/874479/download  https://www.fda.gov/media/587325/download          POCT INFLUENZA A/B MOLECULAR     EKG Readings: (Independently Interpreted)   Initial Reading: No STEMI. Rhythm: Normal Sinus Rhythm. Conduction: Normal. Axis: Normal.       Imaging Results          X-Ray Chest AP Portable (In process)  Result time 05/03/22 19:12:15                 Medications   albuterol-ipratropium 2.5 mg-0.5 mg/3 mL nebulizer solution 3 mL (3 mLs Nebulization Given 5/3/22 1905)   ketorolac injection 30 mg (30 mg Intramuscular Given 5/3/22 1925)   cyclobenzaprine tablet 10 mg (10 mg Oral Given 5/3/22 1925)     Medical Decision Making:   Differential Diagnosis:   Bronchitis, pneumonia, flu, COVID  Clinical Tests:   Lab Tests: Ordered and Reviewed  Radiological Study: Ordered and Reviewed            Attending Attestation:             Attending ED Notes:   51-year-old female with history of smoking presents with cough with pleuritic chest pain with deep breaths and certain movements.  Patient says the pain has been going on for 4 days.  Describes it as sharp constant with episodes of exacerbation.  Has not tried anything for pain.  Afebrile vitals stable.  Physical exam noted.  Reproducible chest pain.  Most likely from cough.  Gave education about quitting smoking.  Plan to discharge patient home with  anti-inflammatory      ED Course as of 05/03/22 1952 Tue May 03, 2022   1951 Labs and imaging noted.  Patient is breathing comfortably at bedside in no respiratory distress.  Will discharge patient home with anti-inflammatory muscle relaxer.  Antitussives.  Strict return precautions [HD]   1951 Advised patient that she must quit smoking and at least hold off on smoking to allow long to heal. [HD]      ED Course User Index  [HD] Ricki Rodriguez MD             Clinical Impression:   Final diagnoses:  [R06.02] SOB (shortness of breath)  [R07.89] Chest tightness  [R05.9] Cough (Primary)          ED Disposition Condition    Discharge Stable        ED Prescriptions     Medication Sig Dispense Start Date End Date Auth. Provider    ibuprofen (ADVIL,MOTRIN) 600 MG tablet Take 1 tablet (600 mg total) by mouth every 6 (six) hours as needed for Pain. 20 tablet 5/3/2022  Ricki Rodriguez MD    benzonatate (TESSALON) 100 MG capsule Take 1 capsule (100 mg total) by mouth 3 (three) times daily as needed for Cough. 20 capsule 5/3/2022 5/13/2022 Ricki Rodriguez MD    cyclobenzaprine (FLEXERIL) 10 MG tablet Take 1 tablet (10 mg total) by mouth 3 (three) times daily as needed for Muscle spasms. 15 tablet 5/3/2022 5/8/2022 Ricki Rodriguez MD        Follow-up Information     Follow up With Specialties Details Why Contact Info    Nile Greenfield II, NP Emergency Medicine In 2 days  [address] [facility] [address]  [phone]             Ricki Rodriguez MD  05/03/22 1953

## 2022-07-15 DIAGNOSIS — Z12.31 ENCOUNTER FOR SCREENING MAMMOGRAM FOR MALIGNANT NEOPLASM OF BREAST: Primary | ICD-10-CM

## 2023-06-12 ENCOUNTER — HOSPITAL ENCOUNTER (EMERGENCY)
Facility: HOSPITAL | Age: 53
Discharge: LEFT BEFORE DISCHARGE | End: 2023-06-12
Attending: STUDENT IN AN ORGANIZED HEALTH CARE EDUCATION/TRAINING PROGRAM
Payer: MEDICAID

## 2023-06-12 VITALS
RESPIRATION RATE: 18 BRPM | OXYGEN SATURATION: 96 % | TEMPERATURE: 98 F | DIASTOLIC BLOOD PRESSURE: 88 MMHG | SYSTOLIC BLOOD PRESSURE: 163 MMHG | HEART RATE: 100 BPM

## 2023-06-12 DIAGNOSIS — R05.9 COUGH, UNSPECIFIED TYPE: Primary | ICD-10-CM

## 2023-06-12 DIAGNOSIS — F19.10 SUBSTANCE ABUSE: ICD-10-CM

## 2023-06-12 LAB
CTP QC/QA: YES
SARS-COV-2 RDRP RESP QL NAA+PROBE: NEGATIVE

## 2023-06-12 PROCEDURE — 99283 EMERGENCY DEPT VISIT LOW MDM: CPT

## 2023-06-12 NOTE — ED PROVIDER NOTES
"Encounter Date: 6/12/2023       History     Chief Complaint   Patient presents with    Cough     EMS reports patient reports productive cough with possibly fever     This is a 53 year-old white female with a hx of CAD and HTN who presents to the ED via EMS for evaluation of mental status changes. Pt daughter called law enforcement for welfare check due to concerns of behavior changes and possible drug use by patient. Upon arrival of EMS patient reports cough and fever, and she was transferred to the ED. Upon examination patient is lethargic with slow responses. She c/o productive cough with subjective fever for 2 days; also reports diarrhea as well. Patient also reports that both arms have been numb for 3 days. She denies substance abuse but admits to taking a xanax, not prescribed, 2 days ago to "calm down".     Review of patient's allergies indicates:   Allergen Reactions    Ativan [lorazepam] Other (See Comments)     seizures    Hydroxyzine Hallucinations     Past Medical History:   Diagnosis Date    Anxiety     Atherosclerotic PVD with intermittent claudication 09/06/2013    CAD (coronary artery disease) 09/06/2013    Cancer     Coronary artery disease     HTN (hypertension) 09/06/2013    Hyperlipidemia 09/06/2013    Hypertension     MI (myocardial infarction)     Seizures     Unstable angina pectoris 09/06/2013     Past Surgical History:   Procedure Laterality Date    CORONARY ANGIOPLASTY      stents in heart      tubaligation       Family History   Problem Relation Age of Onset    Heart attack Mother     Stroke Father     Heart disease Brother      Social History     Tobacco Use    Smoking status: Every Day     Packs/day: 0.05     Types: Cigarettes    Smokeless tobacco: Never    Tobacco comments:     past hx of 1.5 PPD   Substance Use Topics    Alcohol use: Yes     Comment: rarely    Drug use: Not Currently     Review of Systems   Unable to perform ROS: Mental status change   Constitutional:  Positive for " fever.   HENT:  Negative for congestion, rhinorrhea and sore throat.    Respiratory:  Positive for cough.    Cardiovascular:  Positive for chest pain (with cough).   Gastrointestinal:  Positive for diarrhea. Negative for abdominal pain, nausea and vomiting.   Neurological:  Positive for numbness.     Physical Exam     Initial Vitals [06/12/23 1310]   BP Pulse Resp Temp SpO2   (!) 163/88 100 18 98.1 °F (36.7 °C) 96 %      MAP       --         Physical Exam    Nursing note and vitals reviewed.  Constitutional: She appears well-developed and well-nourished. No distress.   HENT:   Mouth/Throat: Oropharynx is clear and moist.   Eyes: EOM are normal. Pupils are equal, round, and reactive to light.   Neck:   Normal range of motion.  Cardiovascular:  Normal rate, regular rhythm and normal heart sounds.           Pulmonary/Chest: Breath sounds normal. No respiratory distress.   Abdominal: Abdomen is soft. Bowel sounds are normal. She exhibits no distension. There is no abdominal tenderness.   Musculoskeletal:      Cervical back: Normal range of motion.     Neurological: She is alert. GCS score is 15. GCS eye subscore is 4. GCS verbal subscore is 5. GCS motor subscore is 6.   Lethargic; CALLAHAN bilat, no unilateral weakness       ED Course   Procedures  Labs Reviewed   DRUG SCREEN PANEL, URINE EMERGENCY   URINALYSIS, REFLEX TO URINE CULTURE   SARS-COV-2 RDRP GENE    Narrative:     This test utilizes isothermal nucleic acid amplification technology to detect the SARS-CoV-2 RdRp nucleic acid segment. The analytical sensitivity (limit of detection) is 500 copies/swab.     A POSITIVE result is indicative of the presence of SARS-CoV-2 RNA; clinical correlation with patient history and other diagnostic information is necessary to determine patient infection status.    A NEGATIVE result means that SARS-CoV-2 nucleic acids are not present above the limit of detection. A NEGATIVE result should be treated as presumptive. It does not rule  out the possibility of COVID-19 and should not be the sole basis for treatment decisions. If COVID-19 is strongly suspected based on clinical and exposure history, re-testing using an alternate molecular assay should be considered.     This test is only for use under the Food and Drug Administration s Emergency Use Authorization (EUA).     Commercial kits are provided by [employer]. Performance characteristics of the EUA have been independently verified by [facility] Department of Pathology and Laboratory Medicine.   _________________________________________________________________   The authorized Fact Sheet for Healthcare Providers and the authorized Fact Sheet for Patients of the ID NOW COVID-19 are available on the FDA website:    https://www.fda.gov/media/848421/download      https://www.fda.gov/media/333529/download              Imaging Results    None          Medications - No data to display              ED Course as of 06/12/23 1421   Mon Jun 12, 2023   1419 Rapid COVID-19 test negative.  All other diagnostics were unable to be completed due to patient eloping from the ED prior to obtaining labs/imaging. [CB]      ED Course User Index  [CB] Gloria Medina NP                 Clinical Impression:   Final diagnoses:  [R05.9] Cough, unspecified type (Primary)  [F19.10] Substance abuse        ED Disposition Condition    AMA                 Gloria Medina NP  06/12/23 1421

## 2023-06-12 NOTE — ED NOTES
Patient left ER when doors were open. Patient states yall stuck sticks in my nose and now im leaving sudarshan العراقي notfiied but patient walked out of doors before sudarshan العراقي was able to talk to her.

## 2023-06-12 NOTE — LETTER
Patient: Angelica Grande  YOB: 1970  Date: 6/12/2023 Time: 2:11 PM  Location: [facility] Department    Leaving the [facility] Against Medical Advice    Chart #:[id number]    This will certify that I, the undersigned,    ______________________________________________________________________    A patient in the above named [facility] center, having requested discharge and removal from the medical [place] against the advice of my attending physician(s), hereby release [facility], its physicians, officers and employees, severally and individually, from any and all liability of any nature whatsoever for any injury or harm or complication of any kind that may result directly or indirectly, by reason of my terminating my stay as a patient at [facility] Department and my departure from [facility], and hereby waive any and all rights of action I may now have or later acquire as a result of my voluntary departure from [facility] and the termination of my stay as a patient therein.    This release is made with the full knowledge of the danger that may result from the action which I am taking.      Date:_______________________                         ___________________________                                                                                    Patient/Legal Representative    Witness:        ____________________________                          ___________________________  Nurse                                                                        Physician

## 2023-06-12 NOTE — ED NOTES
Pt told phlebotomist that she was leaving the ER and didn't want treatment, NP made aware. Pt left the ER, refusing to sign AMA papers. NP made aware.

## 2023-08-13 ENCOUNTER — HOSPITAL ENCOUNTER (EMERGENCY)
Facility: HOSPITAL | Age: 53
Discharge: HOME | End: 2023-08-13
Attending: EMERGENCY MEDICINE
Payer: MEDICAID

## 2023-08-13 VITALS
WEIGHT: 130 LBS | HEART RATE: 68 BPM | BODY MASS INDEX: 24.56 KG/M2 | RESPIRATION RATE: 26 BRPM | DIASTOLIC BLOOD PRESSURE: 104 MMHG | SYSTOLIC BLOOD PRESSURE: 175 MMHG | OXYGEN SATURATION: 98 % | TEMPERATURE: 99 F

## 2023-08-13 DIAGNOSIS — R03.0 ELEVATED BLOOD PRESSURE READING: ICD-10-CM

## 2023-08-13 DIAGNOSIS — F41.9 ANXIETY: Primary | ICD-10-CM

## 2023-08-13 LAB
ALBUMIN SERPL BCP-MCNC: 3.8 G/DL (ref 3.5–5.2)
ALP SERPL-CCNC: 98 U/L (ref 55–135)
ALT SERPL W/O P-5'-P-CCNC: 23 U/L (ref 10–44)
ANION GAP SERPL CALC-SCNC: 8 MMOL/L (ref 8–16)
AST SERPL-CCNC: 21 U/L (ref 10–40)
BASOPHILS # BLD AUTO: 0.08 K/UL (ref 0–0.2)
BASOPHILS NFR BLD: 0.9 % (ref 0–1.9)
BILIRUB SERPL-MCNC: 0.4 MG/DL (ref 0.1–1)
BUN SERPL-MCNC: 12 MG/DL (ref 6–20)
CALCIUM SERPL-MCNC: 8.6 MG/DL (ref 8.7–10.5)
CHLORIDE SERPL-SCNC: 106 MMOL/L (ref 95–110)
CO2 SERPL-SCNC: 24 MMOL/L (ref 23–29)
CREAT SERPL-MCNC: 0.6 MG/DL (ref 0.5–1.4)
DIFFERENTIAL METHOD: ABNORMAL
EOSINOPHIL # BLD AUTO: 0.1 K/UL (ref 0–0.5)
EOSINOPHIL NFR BLD: 1.4 % (ref 0–8)
ERYTHROCYTE [DISTWIDTH] IN BLOOD BY AUTOMATED COUNT: 14.6 % (ref 11.5–14.5)
EST. GFR  (NO RACE VARIABLE): >60 ML/MIN/1.73 M^2
GLUCOSE SERPL-MCNC: 79 MG/DL (ref 70–110)
HCT VFR BLD AUTO: 40.4 % (ref 37–48.5)
HGB BLD-MCNC: 13.5 G/DL (ref 12–16)
IMM GRANULOCYTES # BLD AUTO: 0.03 K/UL (ref 0–0.04)
IMM GRANULOCYTES NFR BLD AUTO: 0.3 % (ref 0–0.5)
LYMPHOCYTES # BLD AUTO: 3.3 K/UL (ref 1–4.8)
LYMPHOCYTES NFR BLD: 37 % (ref 18–48)
MCH RBC QN AUTO: 29.7 PG (ref 27–31)
MCHC RBC AUTO-ENTMCNC: 33.4 G/DL (ref 32–36)
MCV RBC AUTO: 89 FL (ref 82–98)
MONOCYTES # BLD AUTO: 0.7 K/UL (ref 0.3–1)
MONOCYTES NFR BLD: 7.5 % (ref 4–15)
NEUTROPHILS # BLD AUTO: 4.7 K/UL (ref 1.8–7.7)
NEUTROPHILS NFR BLD: 52.9 % (ref 38–73)
NRBC BLD-RTO: 0 /100 WBC
PLATELET # BLD AUTO: 256 K/UL (ref 150–450)
PMV BLD AUTO: 9.9 FL (ref 9.2–12.9)
POTASSIUM SERPL-SCNC: 3.3 MMOL/L (ref 3.5–5.1)
PROT SERPL-MCNC: 7.3 G/DL (ref 6–8.4)
RBC # BLD AUTO: 4.54 M/UL (ref 4–5.4)
SODIUM SERPL-SCNC: 138 MMOL/L (ref 136–145)
TROPONIN I SERPL DL<=0.01 NG/ML-MCNC: <4 PG/ML (ref 0–60)
WBC # BLD AUTO: 8.79 K/UL (ref 3.9–12.7)

## 2023-08-13 PROCEDURE — 25000003 PHARM REV CODE 250: Performed by: EMERGENCY MEDICINE

## 2023-08-13 PROCEDURE — 93010 ELECTROCARDIOGRAM REPORT: CPT | Mod: ,,, | Performed by: INTERNAL MEDICINE

## 2023-08-13 PROCEDURE — 93010 EKG 12-LEAD: ICD-10-PCS | Mod: ,,, | Performed by: INTERNAL MEDICINE

## 2023-08-13 PROCEDURE — 80053 COMPREHEN METABOLIC PANEL: CPT | Performed by: EMERGENCY MEDICINE

## 2023-08-13 PROCEDURE — 93005 ELECTROCARDIOGRAM TRACING: CPT

## 2023-08-13 PROCEDURE — 99284 EMERGENCY DEPT VISIT MOD MDM: CPT

## 2023-08-13 PROCEDURE — 36415 COLL VENOUS BLD VENIPUNCTURE: CPT | Performed by: EMERGENCY MEDICINE

## 2023-08-13 PROCEDURE — 84484 ASSAY OF TROPONIN QUANT: CPT | Performed by: EMERGENCY MEDICINE

## 2023-08-13 PROCEDURE — 85025 COMPLETE CBC W/AUTO DIFF WBC: CPT | Performed by: EMERGENCY MEDICINE

## 2023-08-13 RX ORDER — METOPROLOL TARTRATE 50 MG/1
100 TABLET ORAL
Status: COMPLETED | OUTPATIENT
Start: 2023-08-13 | End: 2023-08-13

## 2023-08-13 RX ORDER — CLONIDINE HYDROCHLORIDE 0.1 MG/1
0.1 TABLET ORAL
Status: COMPLETED | OUTPATIENT
Start: 2023-08-13 | End: 2023-08-13

## 2023-08-13 RX ORDER — LISINOPRIL 5 MG/1
5 TABLET ORAL DAILY
Qty: 30 TABLET | Refills: 0 | Status: SHIPPED | OUTPATIENT
Start: 2023-08-13 | End: 2024-08-12

## 2023-08-13 RX ADMIN — METOPROLOL TARTRATE 100 MG: 50 TABLET, FILM COATED ORAL at 09:08

## 2023-08-13 RX ADMIN — CLONIDINE HYDROCHLORIDE 0.1 MG: 0.1 TABLET ORAL at 09:08

## 2023-08-13 NOTE — ED PROVIDER NOTES
Encounter Date: 8/13/2023       History     Chief Complaint   Patient presents with    Chest Pain     Pt to ED via EMS - stated that pt has been experiencing chest pain x 4 days accompanied with anxiety symptoms. ETOH consumption last night.      53-year-old female with a history of anxiety, hypertension, coronary artery disease presents to the emergency department with chest pain, arm tingling, anxiety this started 4 days ago.  No nausea vomiting.  No diaphoresis.  Not ill appearing, no shortness of breath.  Alert oriented x4, GCS is 15.  Patient attributes all her symptoms to anxiety.  She also states that she ran out of her lisinopril a few days ago, and her blood pressure has been elevated.  No headache.      Review of patient's allergies indicates:   Allergen Reactions    Ativan [lorazepam] Other (See Comments)     seizures    Hydroxyzine Hallucinations     Past Medical History:   Diagnosis Date    Anxiety     Atherosclerotic PVD with intermittent claudication 09/06/2013    CAD (coronary artery disease) 09/06/2013    Cancer     Coronary artery disease     HTN (hypertension) 09/06/2013    Hyperlipidemia 09/06/2013    Hypertension     MI (myocardial infarction)     Seizures     Unstable angina pectoris 09/06/2013     Past Surgical History:   Procedure Laterality Date    CORONARY ANGIOPLASTY      stents in heart      tubaligation       Family History   Problem Relation Age of Onset    Heart attack Mother     Stroke Father     Heart disease Brother      Social History     Tobacco Use    Smoking status: Every Day     Current packs/day: 0.05     Types: Cigarettes    Smokeless tobacco: Never    Tobacco comments:     past hx of 1.5 PPD   Substance Use Topics    Alcohol use: Yes     Comment: rarely    Drug use: Not Currently     Review of Systems   Constitutional:  Negative for fever.   HENT:  Negative for sore throat.    Respiratory:  Negative for shortness of breath.    Cardiovascular:  Positive for chest pain.    Gastrointestinal:  Negative for nausea.   Genitourinary:  Negative for dysuria.   Musculoskeletal:  Negative for back pain.   Skin:  Negative for rash.   Neurological:  Negative for weakness.   Hematological:  Does not bruise/bleed easily.   Psychiatric/Behavioral:  The patient is nervous/anxious.    All other systems reviewed and are negative.      Physical Exam     Initial Vitals   BP Pulse Resp Temp SpO2   08/13/23 0902 08/13/23 0901 08/13/23 0901 08/13/23 0903 08/13/23 0901   (!) 220/105 100 18 99.1 °F (37.3 °C) 99 %      MAP       --                Physical Exam    Nursing note and vitals reviewed.  Constitutional: She appears well-developed and well-nourished. She is not diaphoretic. No distress.   HENT:   Head: Normocephalic and atraumatic.   Mouth/Throat: Oropharynx is clear and moist.   Eyes: Conjunctivae and EOM are normal. Pupils are equal, round, and reactive to light.   Neck: Neck supple. No tracheal deviation present. No JVD present.   Normal range of motion.  Cardiovascular:  Normal rate, regular rhythm, normal heart sounds and intact distal pulses.           No murmur heard.  Pulmonary/Chest: Breath sounds normal. No stridor. No respiratory distress. She has no wheezes. She has no rhonchi. She has no rales. She exhibits no tenderness.   Abdominal: Abdomen is soft. Bowel sounds are normal. She exhibits no distension. There is no abdominal tenderness. There is no rebound and no guarding.   Musculoskeletal:         General: No tenderness or edema. Normal range of motion.      Cervical back: Normal range of motion and neck supple.     Neurological: She is alert and oriented to person, place, and time. She has normal strength. No cranial nerve deficit. GCS score is 15. GCS eye subscore is 4. GCS verbal subscore is 5. GCS motor subscore is 6.   Skin: Skin is warm and dry. Capillary refill takes less than 2 seconds.   Psychiatric:   Anxious female, alert oriented x4, GCS is 15.  No SI, no HI          ED  Course   Procedures  Labs Reviewed   CBC W/ AUTO DIFFERENTIAL - Abnormal; Notable for the following components:       Result Value    RDW 14.6 (*)     All other components within normal limits   COMPREHENSIVE METABOLIC PANEL - Abnormal; Notable for the following components:    Potassium 3.3 (*)     Calcium 8.6 (*)     All other components within normal limits   TROPONIN I HIGH SENSITIVITY     EKG Readings: (Independently Interpreted)   Initial Reading: No STEMI. Rhythm: Normal Sinus Rhythm. Heart Rate: 98. Ectopy: PACs. Conduction: Normal. ST Segments: Normal ST Segments. T Waves: Normal. Axis: Normal. Q Waves: V1 and V2. Clinical Impression: Normal Sinus Rhythm with PACs     ECG Results              EKG 12-lead (Final result)  Result time 08/13/23 09:33:36      Final result by Interface, Lab In [facility] (08/13/23 09:33:36)                   Narrative:    Test Reason : R03.0,    Vent. Rate : 098 BPM     Atrial Rate : 098 BPM     P-R Int : 142 ms          QRS Dur : 072 ms      QT Int : 378 ms       P-R-T Axes : 071 070 074 degrees     QTc Int : 482 ms    Sinus rhythm with Premature atrial complexes  Low septal forces  Otherwise Normal ECG  When compared with ECG of 03-MAY-2022 19:13,  Premature atrial complexes are now Present  Confirmed by Emanuel JIANG MD ([id number]) on 8/13/2023 9:33:30 AM    Referred By: OFELIAERR   SELF           Confirmed By:Emanuel JIANG MD                                  Imaging Results    None          Medications   metoprolol tartrate (LOPRESSOR) tablet 100 mg (100 mg Oral Given 8/13/23 0913)   cloNIDine tablet 0.1 mg (0.1 mg Oral Given 8/13/23 0913)     Medical Decision Making:   Differential Diagnosis:   Anxiety reaction, elevated blood pressure reading, noncompliance with medication             ED Course as of 08/13/23 1006   Sun Aug 13, 2023   1004 Laboratory values are unremarkable, troponin is negative, EKG is unremarkable as well, stable for discharge and follow-up [SD]      ED Course User  Index  [SD] Rian Malik MD                 Clinical Impression:   Final diagnoses:  [R03.0] Elevated blood pressure reading  [F41.9] Anxiety (Primary)        ED Disposition Condition    Discharge Stable          ED Prescriptions       Medication Sig Dispense Start Date End Date Auth. Provider    lisinopriL (PRINIVIL,ZESTRIL) 5 MG tablet Take 1 tablet (5 mg total) by mouth once daily. 30 tablet 8/13/2023 8/12/2024 Rian Malik MD          Follow-up Information       Follow up With Specialties Details Why Contact Info Additional Information    Primary care physician  In 2 days       [facility] Emergency Department Emergency Medicine  As needed [address] [id number] [phone] Floor 1             Rian Malik MD  08/13/23 1006